# Patient Record
Sex: MALE | Race: BLACK OR AFRICAN AMERICAN | NOT HISPANIC OR LATINO | URBAN - NONMETROPOLITAN AREA
[De-identification: names, ages, dates, MRNs, and addresses within clinical notes are randomized per-mention and may not be internally consistent; named-entity substitution may affect disease eponyms.]

---

## 2023-09-20 ENCOUNTER — OFFICE (OUTPATIENT)
Dept: URBAN - NONMETROPOLITAN AREA CLINIC 5 | Facility: CLINIC | Age: 43
End: 2023-09-20
Payer: COMMERCIAL

## 2023-09-20 VITALS
WEIGHT: 315 LBS | RESPIRATION RATE: 20 BRPM | HEIGHT: 78 IN | HEART RATE: 83 BPM | SYSTOLIC BLOOD PRESSURE: 149 MMHG | DIASTOLIC BLOOD PRESSURE: 83 MMHG

## 2023-09-20 DIAGNOSIS — K62.5 HEMORRHAGE OF ANUS AND RECTUM: ICD-10-CM

## 2023-09-20 DIAGNOSIS — R10.32 LEFT LOWER QUADRANT PAIN: ICD-10-CM

## 2023-09-20 PROCEDURE — 99204 OFFICE O/P NEW MOD 45 MIN: CPT | Performed by: INTERNAL MEDICINE

## 2023-09-20 RX ORDER — HYOSCYAMINE SULFATE 0.12 MG/1
TABLET ORAL
Qty: 360 | Refills: 3 | Status: ACTIVE
Start: 2023-09-20

## 2023-09-20 NOTE — SERVICENOTES
Given patient's episodic left upper quadrant pain which feels like a cramping pain I counseled him to attempt Levsin for abortive therapy.  Will plan for cross-sectional imaging with CT scan to rule out other etiology contributing.  Patient does report seeing bright red blood in the stool x1.  He also believes he has a possible family history of colorectal cancer in his grandfather. Therefore will proceed with colonoscopy. He would be high risk for the procedure given obesity.

## 2023-09-20 NOTE — SERVICEHPINOTES
Pete Rose   is a   42 yo  male  with a past medical history of hypertension and a prior kidney stone who presents to establish care for left-sided abdominal pain.  Patient reports for the last 9 months he has had a pain in his left side that can come on at random and feels like a cramping pain.  He has noticed it coming on with getting into smaller vehicle as well as while driving on the road.  It is always in the left side of his abdomen.  He denies experiencing similar episodes in the past.  He reports the cramping is so severe it is even worse than when he had cramps from playing sports when he was younger.  He saw a small amount of blood in the stool one time.  He does believe his grandfather had a GI cancer but is unclear whether this was colon or gastric cancer.  He does work driving a truck.  He denies smoking, illicit drug use, or NSAID use.  He will occasionally drink alcohol.  He has never attempted Levsin for abortive therapy.  He has never had a colonoscopy.  He has not had any cross-sectional imaging with a CT scan.  He works driving a truck.

## 2023-11-20 ENCOUNTER — ON CAMPUS - OUTPATIENT (OUTPATIENT)
Dept: URBAN - NONMETROPOLITAN AREA HOSPITAL 28 | Facility: HOSPITAL | Age: 43
End: 2023-11-20

## 2023-11-20 DIAGNOSIS — K92.1 MELENA: ICD-10-CM

## 2023-11-20 DIAGNOSIS — K64.8 OTHER HEMORRHOIDS: ICD-10-CM

## 2023-11-20 DIAGNOSIS — D12.5 BENIGN NEOPLASM OF SIGMOID COLON: ICD-10-CM

## 2023-11-20 PROCEDURE — 45385 COLONOSCOPY W/LESION REMOVAL: CPT | Performed by: INTERNAL MEDICINE
